# Patient Record
Sex: MALE | Race: OTHER | HISPANIC OR LATINO | URBAN - METROPOLITAN AREA
[De-identification: names, ages, dates, MRNs, and addresses within clinical notes are randomized per-mention and may not be internally consistent; named-entity substitution may affect disease eponyms.]

---

## 2017-07-24 ENCOUNTER — EMERGENCY (EMERGENCY)
Facility: HOSPITAL | Age: 47
LOS: 1 days | Discharge: HOME | End: 2017-07-24
Attending: EMERGENCY MEDICINE
Payer: SELF-PAY

## 2017-07-24 VITALS
OXYGEN SATURATION: 98 % | HEIGHT: 65 IN | TEMPERATURE: 99 F | RESPIRATION RATE: 20 BRPM | HEART RATE: 68 BPM | SYSTOLIC BLOOD PRESSURE: 125 MMHG | DIASTOLIC BLOOD PRESSURE: 83 MMHG | WEIGHT: 160.06 LBS

## 2017-07-24 VITALS
RESPIRATION RATE: 20 BRPM | HEART RATE: 64 BPM | OXYGEN SATURATION: 99 % | DIASTOLIC BLOOD PRESSURE: 62 MMHG | SYSTOLIC BLOOD PRESSURE: 128 MMHG

## 2017-07-24 LAB
ALBUMIN SERPL ELPH-MCNC: 4.7 G/DL — SIGNIFICANT CHANGE UP (ref 3.3–5.2)
ALP SERPL-CCNC: 94 U/L — SIGNIFICANT CHANGE UP (ref 40–120)
ALT FLD-CCNC: 8 U/L — SIGNIFICANT CHANGE UP
ANION GAP SERPL CALC-SCNC: 13 MMOL/L — SIGNIFICANT CHANGE UP (ref 5–17)
AST SERPL-CCNC: 23 U/L — SIGNIFICANT CHANGE UP
BASOPHILS # BLD AUTO: 0 K/UL — SIGNIFICANT CHANGE UP (ref 0–0.2)
BASOPHILS NFR BLD AUTO: 0.2 % — SIGNIFICANT CHANGE UP (ref 0–2)
BILIRUB SERPL-MCNC: 0.6 MG/DL — SIGNIFICANT CHANGE UP (ref 0.4–2)
BUN SERPL-MCNC: 16 MG/DL — SIGNIFICANT CHANGE UP (ref 8–20)
CALCIUM SERPL-MCNC: 9 MG/DL — SIGNIFICANT CHANGE UP (ref 8.6–10.2)
CHLORIDE SERPL-SCNC: 99 MMOL/L — SIGNIFICANT CHANGE UP (ref 98–107)
CO2 SERPL-SCNC: 24 MMOL/L — SIGNIFICANT CHANGE UP (ref 22–29)
CREAT SERPL-MCNC: 0.74 MG/DL — SIGNIFICANT CHANGE UP (ref 0.5–1.3)
EOSINOPHIL # BLD AUTO: 0.2 K/UL — SIGNIFICANT CHANGE UP (ref 0–0.5)
EOSINOPHIL NFR BLD AUTO: 3.1 % — SIGNIFICANT CHANGE UP (ref 0–5)
GLUCOSE SERPL-MCNC: 104 MG/DL — SIGNIFICANT CHANGE UP (ref 70–115)
HCT VFR BLD CALC: 46.6 % — SIGNIFICANT CHANGE UP (ref 42–52)
HGB BLD-MCNC: 16.4 G/DL — SIGNIFICANT CHANGE UP (ref 14–18)
LYMPHOCYTES # BLD AUTO: 1.5 K/UL — SIGNIFICANT CHANGE UP (ref 1–4.8)
LYMPHOCYTES # BLD AUTO: 24.3 % — SIGNIFICANT CHANGE UP (ref 20–55)
MCHC RBC-ENTMCNC: 30.5 PG — SIGNIFICANT CHANGE UP (ref 27–31)
MCHC RBC-ENTMCNC: 35.2 G/DL — SIGNIFICANT CHANGE UP (ref 32–36)
MCV RBC AUTO: 86.6 FL — SIGNIFICANT CHANGE UP (ref 80–94)
MONOCYTES # BLD AUTO: 0.5 K/UL — SIGNIFICANT CHANGE UP (ref 0–0.8)
MONOCYTES NFR BLD AUTO: 7.9 % — SIGNIFICANT CHANGE UP (ref 3–10)
NEUTROPHILS # BLD AUTO: 4 K/UL — SIGNIFICANT CHANGE UP (ref 1.8–8)
NEUTROPHILS NFR BLD AUTO: 64.2 % — SIGNIFICANT CHANGE UP (ref 37–73)
PLATELET # BLD AUTO: 190 K/UL — SIGNIFICANT CHANGE UP (ref 150–400)
POTASSIUM SERPL-MCNC: 4.6 MMOL/L — SIGNIFICANT CHANGE UP (ref 3.5–5.3)
POTASSIUM SERPL-SCNC: 4.6 MMOL/L — SIGNIFICANT CHANGE UP (ref 3.5–5.3)
PROT SERPL-MCNC: 8 G/DL — SIGNIFICANT CHANGE UP (ref 6.6–8.7)
RBC # BLD: 5.38 M/UL — SIGNIFICANT CHANGE UP (ref 4.6–6.2)
RBC # FLD: 13.2 % — SIGNIFICANT CHANGE UP (ref 11–15.6)
SODIUM SERPL-SCNC: 136 MMOL/L — SIGNIFICANT CHANGE UP (ref 135–145)
WBC # BLD: 6.2 K/UL — SIGNIFICANT CHANGE UP (ref 4.8–10.8)
WBC # FLD AUTO: 6.2 K/UL — SIGNIFICANT CHANGE UP (ref 4.8–10.8)

## 2017-07-24 PROCEDURE — 73130 X-RAY EXAM OF HAND: CPT | Mod: 26,RT

## 2017-07-24 PROCEDURE — 99284 EMERGENCY DEPT VISIT MOD MDM: CPT

## 2017-07-24 NOTE — ED STATDOCS - OBJECTIVE STATEMENT
45 y/o M presents to ED c/o b/l upper extremities swelling and LLE numbness x2 weeks. Associated hot flash last night, nocturia, urinary frequency (7-8 times/day), intermittent pedal edema. Pain has worsened over time and in the past 2 days he is able to sleep due to the pain. Pt states that he is unable to bend his arms due to the swelling. he notes he had PNA in the L lung last year and visited [facility] for tx. Pt takes Tylenol and Aspirin. Pt works in furniture delivery. Denies hx of similar sx, rash, CP, SOB, or NARANJO. NKDA.   : Nadeen 47 y/o M presents to ED c/o b/l upper extremities swelling and LLE numbness x2 weeks. Associated hot flash last night, nocturia, urinary frequency (7-8 times/day), intermittent pedal edema. Pain has worsened over time and in the past 2 days he is able to sleep due to the pain. Pt states that he is unable to bend his arms due to the swelling. He notes he had PNA in the L lung last year and visited [facility] for tx. Pt takes Motrin (2 tablets q4-8h) to no relief. Denies hx of similar sx, rash, CP, SOB, NARANJO or recent trauma/injury. He does not currently f/u with a PMD. NKDA. 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 : Nadeen 47 y/o M presents to ED c/o b/l upper extremities swelling and LLE numbness x2 weeks. Associated hot flash last night, nocturia, urinary frequency (7-8 times/day), intermittent pedal edema. Pain has worsened over time and in the past 2 days he is able to sleep due to the pain. Pt states that he is unable to bend his arms due to the swelling. He notes he had PNA in the L lung last year and visited [facility] for tx. Pt takes Motrin (2 tablets q4-8h) to no relief. Denies hx of similar sx, rash, CP, SOB, NARANJO or recent trauma/injury. He does not currently f/u with a PMD. NKDA. Employed: Furniture delivery. : Nadeen 45 y/o M presents to ED c/o b/l upper extremities swelling and LLE numbness.  Symptoms started approx 2 weeks ago and getting worse.  Reports inability to sleep for the past 2 days due to symptoms.  Reports that "body felt hot last night and feet felt hot.  Notes urinary frequency (7-8 times/day), nocturia (4-5 times per night) and "occasional" leg swelling.  Denies SOB/ exertional dyspnea, chest pain/ pressure.   Pt states that he is unable to bend his arms due to the swelling. He notes he had PNA in the L lung last year and visited [facility] for tx. Pt takes Motrin (2 tablets q4-8h) to no relief. Denies hx of similar sx, rash, CP, SOB, NARANJO or recent trauma/injury. Denies known bug bites/ tick bites.  He does not currently f/u with a PMD. NKDA. Employed: Furniture delivery. : Nadeen

## 2017-07-24 NOTE — ED STATDOCS - MUSCULOSKELETAL, MLM
No joint swelling, erythema or tenderness. FROM elbow, wrist and shoulder b/l. TTP over the 5th metacarpal. No pedal edema. DP Pulses are 1+ b/l. No knee swelling. Equal radial pulses b/l. Pain with ROM of L knee.

## 2017-07-24 NOTE — ED ADULT TRIAGE NOTE - CHIEF COMPLAINT QUOTE
Patient arrived to ED today with c/o body pain and aches and swelling.  Patient states pain to his knees bilaterally to his heels.

## 2017-07-24 NOTE — ED ADULT NURSE NOTE - OBJECTIVE STATEMENT
Pt axox3 c/o right back pain and right knee pain starting 3 weeks ago.  Pt states it is work related and may have been caused by picking up a heavy  object. Pt gait steady and is able to ambulate with out any assistance. Pt denies any recent inury/falls

## 2017-07-24 NOTE — ED STATDOCS - ATTENDING CONTRIBUTION TO CARE
I, Augustus Mcmillan, performed the initial face to face bedside interview with this patient regarding history of present illness, review of symptoms and relevant past medical, social and family history.  I completed an independent physical examination.  I was the provider who initially evaluated this patient.  The history, relevant review of systems, past medical and surgical history, medical decision making, and physical examination was documented by the scribe in my presence and I attest to the accuracy of the documentation. Follow-up on ordered tests (ie labs, radiologic studies) and re-evaluation of the patient's status has been communicated to the ACP.  Disposition of the patient will be based on test outcome and response to ED interventions.

## 2017-07-25 LAB
B BURGDOR C6 AB SER-ACNC: NEGATIVE — SIGNIFICANT CHANGE UP
B BURGDOR IGG+IGM SER-ACNC: 0.05 INDEX — SIGNIFICANT CHANGE UP (ref 0.01–0.89)

## 2019-04-03 NOTE — ED ADULT TRIAGE NOTE - SPO2 (%)
98 Xenograft Text: The defect edges were debeveled with a #15 scalpel blade.  Given the location of the defect, shape of the defect and the proximity to free margins a xenograft was deemed most appropriate.  The graft was then trimmed to fit the size of the defect.  The graft was then placed in the primary defect and oriented appropriately.

## 2023-09-26 NOTE — ED STATDOCS - CARDIOVASCULAR [+], MLM
PERIPHERAL EDEMA Azathioprine Counseling:  I discussed with the patient the risks of azathioprine including but not limited to myelosuppression, immunosuppression, hepatotoxicity, lymphoma, and infections.  The patient understands that monitoring is required including baseline LFTs, Creatinine, possible TPMP genotyping and weekly CBCs for the first month and then every 2 weeks thereafter.  The patient verbalized understanding of the proper use and possible adverse effects of azathioprine.  All of the patient's questions and concerns were addressed.

## 2024-09-26 NOTE — ED STATDOCS - CROS ED EYES ALL NEG
Visit Discharge/Physician Orders     Discharge condition: Stable     Assessment of pain at discharge: yes     Anesthetic used: 4% lidocaine solution     Discharge to: Home     Left via:Private automobile     Accompanied by:self     ECF/HHA: n/a     Dressing Orders: LEFT MEDIAL LEG: Cleanse with normal saline, apply zinc to fiona wound, drawtex, ABD pad, and profore . Change weekly.        Treatment Orders: Eat a diet high in protein and vitamin C. Take a multiple vitamin daily unless contraindicated.     Dr. Duncan as needed.       Must wear slip on shoe to work due to wrap on leg!        Keep wrap dry at all times. If wrap becomes wet or falls 2 inches call [facility] ([phone])and may remove wrap and apply double tubigrip.       [facility] followup visit : 1 week __________________________________  (Please note your next appointment above and if you are unable to keep, kindly give a 24 hour notice. Thank you.)     Physician signature:__________________________     If you experience any of the following, please call the Wound Care Center during business hours:     * Increase in Pain  * Temperature over 101  * Increase in drainage from your wound  * Drainage with a foul odor  * Bleeding  * Increase in swelling  * Need for compression bandage changes due to slippage, breakthrough drainage.     If you need medical attention outside of the business hours of the [facility] please contact your PCP or go to the nearest emergency room.        
- - -

## 2025-02-02 NOTE — ED STATDOCS - MEDICAL DECISION MAKING DETAILS
Arthralgia without findings on physical exam. Plan to order lime titer, XR of hand and f/u with PMD.
No